# Patient Record
(demographics unavailable — no encounter records)

---

## 2024-10-21 NOTE — HISTORY OF PRESENT ILLNESS
[FreeTextEntry1] : This is a 52 year y/o female with a PMHx of HLD presents today for acute visit.  CAC 0 (2022) Pt reports about 2 weeks ago she felt a constant chest discomfort/ chest tightness, Non exertional that lasted 30-40 minutes, pain resolved on its own. Pt reports a 2nd episode of a chest discomfort occurred last Thursday while she was in bed. Her BP at the time was 150/105. Pt reports the pain resolved and BP went back to normal. Patient denies dyspnea, palpitations, dizziness, syncope, changes in bowel/bladder habits or appetite

## 2024-12-23 NOTE — IMAGING
[de-identified] : Right elbow/forearm No ecchymosis, swelling or wounds Normal muscle tone/ bulk TTP along the ECRB and lateral epicondyle Full symmetric elbow/wrist ROM Pain reproduced in the lateral epicondyle with resisted wrist extension Able to make a painless composite fist +AIN/ PIN/ Ulnar n SILT throughout fingers wwp  3 views right forearm are available for review: No acute fractures, malalignment or cortical irregularities MRI right forearm (9/3/24): Nonspecific soft tissue edema over the ulnar margin of the proximal ulnar metaphysis which could be traumatic or inflammatory.  No fracture or muscle tear.

## 2024-12-23 NOTE — IMAGING
[de-identified] : Right elbow/forearm No ecchymosis, swelling or wounds Normal muscle tone/ bulk TTP along the ECRB and lateral epicondyle Full symmetric elbow/wrist ROM Pain reproduced in the lateral epicondyle with resisted wrist extension Able to make a painless composite fist +AIN/ PIN/ Ulnar n SILT throughout fingers wwp  3 views right forearm are available for review: No acute fractures, malalignment or cortical irregularities MRI right forearm (9/3/24): Nonspecific soft tissue edema over the ulnar margin of the proximal ulnar metaphysis which could be traumatic or inflammatory.  No fracture or muscle tear.

## 2024-12-23 NOTE — HISTORY OF PRESENT ILLNESS
[de-identified] : 12/17/24: The patient returns today for repeat evaluation of her right lateral epicondylitis.  She received a CSI nearly 3 months ago with complete resolution of her lateral elbow pain, returning roughly 2 weeks ago.  She is frustrated by the recurrence of her symptoms and inquires about additional treatments.  No new injuries, numbness or tingling.  09/04/2024 CAREN SHABAZZ is a 52 year old female here today for: Location: Right forearm/elbow Complaint: The patient presents the office today for evaluation of right lateral elbow and forearm pain.  She notes the insidious onset of atraumatic right forearm pain beginning roughly 3 weeks ago.  She notes exacerbating factors including heavy lifting.  She notes a short period of time last week where this pain was so severe that it limited many of her activities.  No numbness or tingling.  She has an MRI available for review today. 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

## 2024-12-23 NOTE — HISTORY OF PRESENT ILLNESS
[de-identified] : 12/17/24: The patient returns today for repeat evaluation of her right lateral epicondylitis.  She received a CSI nearly 3 months ago with complete resolution of her lateral elbow pain, returning roughly 2 weeks ago.  She is frustrated by the recurrence of her symptoms and inquires about additional treatments.  No new injuries, numbness or tingling.  09/04/2024 CAREN SHABAZZ is a 52 year old female here today for: Location: Right forearm/elbow Complaint: The patient presents the office today for evaluation of right lateral elbow and forearm pain.  She notes the insidious onset of atraumatic right forearm pain beginning roughly 3 weeks ago.  She notes exacerbating factors including heavy lifting.  She notes a short period of time last week where this pain was so severe that it limited many of her activities.  No numbness or tingling.  She has an MRI available for review today. 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

## 2025-01-27 NOTE — HISTORY OF PRESENT ILLNESS
[FreeTextEntry1] : CAREN SHABAZZ  is a 52 year old F w/ pmhx of HLD, CAC 0,  who presents today for a routine follow up. The patient denies fever, chills, sore throat, loss of taste or smell, muscle aches weight loss, malaise, rash, recent travel, insect bites, alteration bowel habits, headaches, weakness, abdominal  pain, bloating, changes in urination, visual disturbances, shortness of breath, chest pain, dizziness, palpitations. The patient is here for follow-up of and ongoing management  elevated cholesterol. Patient is currently tolerating medication and denies muscle pain, joint pain, back pain,  urinary changes , nausea, vomiting, abdominal pain or diarrhea. The patient is trying to follow a low cholesterol diet. Pt is following with ortho for tennis elbow and starting PT. She is starting PT for it. Pt also reports her left hip has been bothering her. She stated she has a history labral tear. She reports she has stiffness in the right hip and is requesting physical therapy script.

## 2025-03-19 NOTE — HISTORY OF PRESENT ILLNESS
[de-identified] : 03/19/2025 CAREN SHABAZZ is a 52 year old female here today for: repeat evaluation of her right lateral epicondylitis. Patient is under NI because her left elbow and forearm has begun to give her pain due to overcompensation. Notes burning/hammering feeling. Denies n/t. Notes intense sense of weakness in both arms. 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

## 2025-04-07 NOTE — PHYSICAL EXAM
[Well Developed] : well developed [Well Nourished] : well nourished [No Acute Distress] : no acute distress [Normal Conjunctiva] : normal conjunctiva [Normal Venous Pressure] : normal venous pressure [No Carotid Bruit] : no carotid bruit [Clear Lung Fields] : clear lung fields [Good Air Entry] : good air entry [No Respiratory Distress] : no respiratory distress  [Soft] : abdomen soft [Non Tender] : non-tender [No Masses/organomegaly] : no masses/organomegaly [Normal Bowel Sounds] : normal bowel sounds [Normal Gait] : normal gait [No Edema] : no edema [No Cyanosis] : no cyanosis [No Clubbing] : no clubbing [No Varicosities] : no varicosities [No Rash] : no rash [No Skin Lesions] : no skin lesions [Moves all extremities] : moves all extremities [No Focal Deficits] : no focal deficits [Normal Speech] : normal speech [Alert and Oriented] : alert and oriented [Normal memory] : normal memory [de-identified] : Regular rate and rhythm, NL S1, S2, non-displaced PMI, chest non-tender; no rubs,heaves  or gallops a  Grade 2/6 systolic murmur noted at the LSB

## 2025-04-07 NOTE — HISTORY OF PRESENT ILLNESS
[FreeTextEntry1] : CAREN SHABAZZ is a 52 year old F w/ pmhx of HLD, CAC 0, who presents today for a routine follow up. Of late pt complaining of generalized joint and musculoskeletal pain. Was noted to have an elevated NAOMY of 1:320 - has not seen rheumatology to date. Pt does have an upcoming appt with Rheum Dr. Kervin Ram. Is on Wegovy with 0.25mg with good affect. Denies n/v/c or diarrhea.   The patient is here for follow-up of elevated cholesterol. Patient is currently tolerating medication and denies muscle pain, joint pain, back pain,  urinary changes , nausea, vomiting, abdominal pain or diarrhea. The patient is trying to follow a low cholesterol diet.

## 2025-04-07 NOTE — PHYSICAL EXAM
[Well Developed] : well developed [Well Nourished] : well nourished [No Acute Distress] : no acute distress [Normal Conjunctiva] : normal conjunctiva [Normal Venous Pressure] : normal venous pressure [No Carotid Bruit] : no carotid bruit [Clear Lung Fields] : clear lung fields [Good Air Entry] : good air entry [No Respiratory Distress] : no respiratory distress  [Soft] : abdomen soft [Non Tender] : non-tender [No Masses/organomegaly] : no masses/organomegaly [Normal Bowel Sounds] : normal bowel sounds [Normal Gait] : normal gait [No Edema] : no edema [No Cyanosis] : no cyanosis [No Clubbing] : no clubbing [No Varicosities] : no varicosities [No Rash] : no rash [No Skin Lesions] : no skin lesions [Moves all extremities] : moves all extremities [No Focal Deficits] : no focal deficits [Normal Speech] : normal speech [Alert and Oriented] : alert and oriented [Normal memory] : normal memory [de-identified] : Regular rate and rhythm, NL S1, S2, non-displaced PMI, chest non-tender; no rubs,heaves  or gallops a  Grade 2/6 systolic murmur noted at the LSB

## 2025-04-28 NOTE — HISTORY OF PRESENT ILLNESS
[FreeTextEntry1] : Patient was first evaluated in 2020 for joint pain and +NAOMY History as detailed below She returns today -reports progressive weakness and pain over the past year  -reports pain in the elbows -pain in the legs -pain in the hands/stiffness -takes Advil for severe pain  -no swelling of the joints, no warmth, or redness -no rashes, no oral or nasal ulcers, no dry eyes mouth     initial history   =Reports having these symptoms for the past several months, before the onset of the pandemic  progressively worsening  she reports generalized body pains and achiness of the entire body, more so at the hands/fingers and hips. she reports stiffness after sitting or bending. no associated swelling, no morning stiffness  not taking anything for pain  -she reports worsening anxiety, some familial health related issues are concerning her  endorses poor memory, poor sleep and anxiety  started on Lexapro by PMD    Rheumatologic ROS:  - excessive hair loss, thinning  - No dry eyes  -+ dry mouth  - No history of red/painful eye  - No oral ulcers  - No reflux or dysphagia  - No Raynaud's  - No rashes or photosensitivity  - 10 miscarriages  - No history of blood clots  - family history of auto-immune disease: son with CD    =Works as a pediatrician  Initial impression documented below  =Chronic generalized body aches and arthralgias/myalgias  no inflammatory in pattern, no evidence of inflammatory arthritis on exam today  Normal ESR/CRP and negative RF/CCP  =Complaints of anxiety, poor sleep, memory difficulties...as well as multiple tender points on exam  suggest of FM/myofascial syndrome likely triggered by underling anxiety disorder  Recently started on Lexapro  =+NAOMY, patient has no stigmata to suggest SLE at this time except for hair loss and arthralgias  no cytopenias, no renal disease..  recurrent miscarriages  =Work up at initial visit DsDNA 47 borderline  C3, C4, Sm, RNP and aPls all negative  Reviewed with patient that at this time she has a +NAOMY and a borderline DsDNA in the setting of hair loss and arthralgias, not enough to be classified as SLE based on criteria but possibly "early/incomplete" form  discussed the option of observation and repeat testing vs starting HCQ  no evidence of cytopenias or renal disease  she would prefer to observe and repeat testing

## 2025-04-28 NOTE — ASSESSMENT
[FreeTextEntry1] : # Arthralgias -generalized -no associated swelling, redness, warmth -morning stiffness for few minutes  -improves with NSAIDs  # Weakness -generalized -normal CK  # NAOMY positive -ROS other than arthralgia remain unrvealing -subset serology in 2020 negative, mildly elevated DsDNA, suggested at trial of HCQ at that time, she declined  # Episodes of abdominal pressure, lasts for 45 min cardiac work up unrevealing  # Menopause, hormonal changes started on Effexor by PMD  [] will obtain serology today, check inflammatory markers [] discussed a trial of HCQ again today, will review again after labs [] discussed switching from Effexor to duloxetine or amitriptyline   Will call with results

## 2025-04-28 NOTE — DATA REVIEWED
[FreeTextEntry1] : Labs and chart notes reviewed today with patient NAOMY positive RF negative ESR and CRP normal